# Patient Record
Sex: MALE | Race: WHITE
[De-identification: names, ages, dates, MRNs, and addresses within clinical notes are randomized per-mention and may not be internally consistent; named-entity substitution may affect disease eponyms.]

---

## 2017-10-14 ENCOUNTER — HOSPITAL ENCOUNTER (EMERGENCY)
Dept: HOSPITAL 31 - C.ER | Age: 62
Discharge: HOME | End: 2017-10-14
Payer: MEDICAID

## 2017-10-14 VITALS — HEART RATE: 57 BPM | RESPIRATION RATE: 16 BRPM | DIASTOLIC BLOOD PRESSURE: 68 MMHG | SYSTOLIC BLOOD PRESSURE: 147 MMHG

## 2017-10-14 VITALS — OXYGEN SATURATION: 100 % | TEMPERATURE: 97.7 F

## 2017-10-14 DIAGNOSIS — R10.9: Primary | ICD-10-CM

## 2017-10-14 LAB
ALBUMIN/GLOB SERPL: 1.1 {RATIO} (ref 1–2.1)
ALP SERPL-CCNC: 75 U/L (ref 38–126)
ALT SERPL-CCNC: 40 U/L (ref 21–72)
AST SERPL-CCNC: 31 U/L (ref 17–59)
BASOPHILS # BLD AUTO: 0 K/UL (ref 0–0.2)
BASOPHILS NFR BLD: 0.4 % (ref 0–2)
BILIRUB DIRECT SERPL-MCNC: 0.3 MG/DL (ref 0–0.4)
BILIRUB SERPL-MCNC: 0.8 MG/DL (ref 0.2–1.3)
BILIRUB UR-MCNC: NEGATIVE MG/DL
BUN SERPL-MCNC: 13 MG/DL (ref 9–20)
CALCIUM SERPL-MCNC: 8.9 MG/DL (ref 8.6–10.4)
CHLORIDE SERPL-SCNC: 100 MMOL/L (ref 98–107)
CO2 SERPL-SCNC: 25 MMOL/L (ref 22–30)
EOSINOPHIL # BLD AUTO: 0.1 K/UL (ref 0–0.7)
EOSINOPHIL NFR BLD: 1.5 % (ref 0–4)
ERYTHROCYTE [DISTWIDTH] IN BLOOD BY AUTOMATED COUNT: 13.3 % (ref 11.5–14.5)
GLOBULIN SER-MCNC: 3.5 GM/DL (ref 2.2–3.9)
GLUCOSE SERPL-MCNC: 276 MG/DL (ref 75–110)
GLUCOSE UR STRIP-MCNC: (no result) MG/DL
HCT VFR BLD CALC: 37.8 % (ref 35–51)
KETONES UR STRIP-MCNC: NEGATIVE MG/DL
LEUKOCYTE ESTERASE UR-ACNC: (no result) LEU/UL
LYMPHOCYTES # BLD AUTO: 1.8 K/UL (ref 1–4.3)
LYMPHOCYTES NFR BLD AUTO: 27.5 % (ref 20–40)
MCH RBC QN AUTO: 32.6 PG (ref 27–31)
MCHC RBC AUTO-ENTMCNC: 34.1 G/DL (ref 33–37)
MCV RBC AUTO: 95.6 FL (ref 80–94)
MONOCYTES # BLD: 0.5 K/UL (ref 0–0.8)
MONOCYTES NFR BLD: 7.3 % (ref 0–10)
NRBC BLD AUTO-RTO: 0 % (ref 0–2)
PH UR STRIP: 6 [PH] (ref 5–8)
PLATELET # BLD: 161 K/UL (ref 130–400)
PMV BLD AUTO: 9.1 FL (ref 7.2–11.7)
POTASSIUM SERPL-SCNC: 4.4 MMOL/L (ref 3.6–5.2)
PROT SERPL-MCNC: 7.4 G/DL (ref 6.3–8.3)
PROT UR STRIP-MCNC: NEGATIVE MG/DL
RBC # UR STRIP: NEGATIVE /UL
SODIUM SERPL-SCNC: 135 MMOL/L (ref 132–148)
SP GR UR STRIP: 1.01 (ref 1–1.03)
UROBILINOGEN UR-MCNC: NORMAL MG/DL (ref 0.2–1)
WBC # BLD AUTO: 6.5 K/UL (ref 4.8–10.8)
WBC #/AREA URNS HPF: 5 /HPF (ref 0–5)

## 2017-10-14 NOTE — CT
PROCEDURE:  CT Abdomen and Pelvis without intravenous contrast



HISTORY:

left flank pain - h/o kidney stone



COMPARISON:

None.



TECHNIQUE:

Axial and reformatted coronal and sagittal CT images of the abdomen 

and pelvis were obtained without IV or oral contrast administration.. 



Contrast Dose: 0



Radiation dose:



Total exam DLP = 347.11 mGy-cm.



This CT exam was performed using one or more of the following dose 

reduction techniques: Automated exposure control, adjustment of the 

mA and/or kV according to patient size, and/or use of iterative 

reconstruction technique.



FINDINGS:



LOWER THORAX:

Unremarkable. 



LIVER:

Unremarkable. No gross lesion or ductal dilatation.  



GALLBLADDER AND BILE DUCTS:

The gallbladder is not visualized. 



PANCREAS:

Unremarkable. No gross lesion or ductal dilatation.



SPLEEN:

Unremarkable. 



ADRENALS:

Unremarkable. No mass. 



KIDNEYS AND URETERS:

There is 4 millimeter nonobstructing calculus at the upper pole of 

the left kidney. There is 4.5 millimeter calcification at the mid to 

lower pole left kidney likely represent vascular calcification.



There is 3 millimeter calcification at the midpole right kidney 

likely represent nonobstructing calculi.  There is also 3 millimeter 

calcification at the lower pole of the right kidney represent 

nonobstructing calculi.



Mildly dilated left kidney collecting system without evidence of 

obstructing stone. Mild left perinephric stranding noted more than 

the right. 



VASCULATURE:

Unremarkable. No aortic aneurysm. 



BOWEL:

Unremarkable. No obstruction. No gross mural thickening. 



APPENDIX:

Unremarkable. Normal appendix. 



PERITONEUM:

Unremarkable. No free fluid. No free air. 



LYMPH NODES:

Unremarkable. No enlarged lymph nodes. 



BLADDER:

Mild urinary bladder wall thickening. 



REPRODUCTIVE:

Heterogeneous mildly enlarged prostate gland is noted. 



BONES:

No acute fracture. 



OTHER FINDINGS:

None.



IMPRESSION:

Bilateral nonobstructing renal calculi.



Mildly dilated left kidney collecting system without evidence of 

obstructing stone.



Otherwise no evidence of acute pathology in the abdomen and pelvis.

## 2017-10-14 NOTE — C.PDOC
History Of Present Illness


62 year old male presents to the ED with complaints of left flank pain 

intermittent for the past week. Patient states the pain started on his left 

lower back and is now located on the left flank of his abdomen. Patient has a 

prior history of kidney stones that required stem placement for removal. 

Patient denies fever, nausea, vomit, hematuria, or dysuria.   


Chief Complaint (Nursing): Male Genitourinary


History Per: Patient


History/Exam Limitations: no limitations


Onset/Duration Of Symptoms: Intermittent Episodes


Current Symptoms Are (Timing): Still Present


Quality Of Discomfort: "Pain"


Associated Symptoms: Back Pain.  denies: Fever, Nausea, Vomiting, Urinary 

Symptoms


Recent travel outside of the [place]: No


Additional History Per: Patient





Past Medical History


Reviewed: Historical Data, Nursing Documentation, Vital Signs


Vital Signs: 


 Last Vital Signs











Temp  97.7 F   10/14/17 12:36


 


Pulse  57 L  10/14/17 14:31


 


Resp  16   10/14/17 14:31


 


BP  147/68   10/14/17 14:31


 


Pulse Ox  100   10/14/17 15:22














- Medical History


PMH: Kidney Stones


Surgical History: No Surg Hx


Family History: States: Unknown Family Hx





- Social History


Hx Alcohol Use: No


Hx Substance Use: No





- Immunization History


Hx Tetanus Toxoid Vaccination: No


Hx Influenza Vaccination: No


Hx Pneumococcal Vaccination: No





Review Of Systems


Constitutional: Negative for: Fever, Chills


Gastrointestinal: Positive for: Abdominal Pain (left flank).  Negative for: 

Nausea, Vomiting


Genitourinary: Negative for: Dysuria, Hematuria, Penile Discharge


Neurological: Negative for: Weakness, Numbness





Physical Exam





- Physical Exam


Appears: Non-toxic, No Acute Distress


Skin: Normal Color, Warm, Dry


Head: Atraumatic, Normacephalic


Eye(s): bilateral: Normal Inspection


Oral Mucosa: Moist


Neck: Normal, Supple


Chest: Symmetrical


Cardiovascular: Rhythm Regular


Respiratory: Normal Breath Sounds, No Rales, No Rhonchi, No Wheezing


Gastrointestinal/Abdominal: Soft, No Tenderness, No Guarding, No Rebound


Back: CVA Tenderness (minimal left side)


Neurological/Psych: Oriented x3, Normal Speech, Normal Cognition





ED Course And Treatment





- Laboratory Results


Result Diagrams: 


 10/14/17 13:16





 10/14/17 13:16


O2 Sat by Pulse Oximetry: 100 (On RA)


Pulse Ox Interpretation: Normal





- CT Scan/US


  ** Abdomen/ Pelvis


Other Rad Studies (CT/US): Interpreted By Me, Read By Radiologist, Radiology 

Report Reviewed


CT/US Interpretation: IMPRESSION:  Bilateral nonobstructing renal calculi.  

Mildly dilated left kidney collecting system without evidence of obstructing 

stone.  Otherwise no evidence of acute pathology in the abdomen and pelvis.





Medical Decision Making


Medical Decision Making: 


Impression: 63 y/o presents with left flank pain for the past week.


Plan :


* CT abdo/pelvis ordered


* Blood work and UA ordered


* Urine culture collected





Patient is currently eating well, in no apparent distress. 





Disposition





- Disposition


Referrals: 


[facility] [Outside]


Pavel Muñoz MD [Staff Provider] - 


Disposition: HOME/ ROUTINE


Disposition Time: 15:10


Condition: GOOD


Additional Instructions: 





Thank you for letting us take care of you today. Your provider was Dr. Ho. You were treated for flank pain. The emergency medical care you 

received today was directed at your acute symptoms. If you were prescribed any 

medication, please fill it and take as directed. It may take several days for 

your symptoms to resolve. Return to the Emergency Department if your symptoms 

worsen, do not improve, or if you have any other problems.





Please contact your doctor or call one of the physicians/clinics you have been 

referred to that are listed on the Patient Visit Information form that is 

included in your discharge packet. Bring any paperwork you were given at 

discharge with you along with any medications you are taking to your follow up 

visit. Our treatment cannot replace ongoing medical care by a primary care 

provider (PCP) outside of the emergency department.





Thank you for allowing the [facility] team to be part of your care today.














Follow up with urology in 2-3 days for re-evaluation and further management.


Prescriptions: 


Ciprofloxacin [Cipro] 500 mg PO BID #14 tab


Ibuprofen [Motrin] 600 mg PO Q6 PRN #20 tab


 PRN Reason: Pain, Moderate (4-7)


Instructions:  Flank Pain (ED)


Forms:  [facility] (English)





- Clinical Impression


Clinical Impression: 


 Flank pain








- Scribe Statement


The provider has reviewed the documentation as recorded by the Scribe





Sebastián Villalba





All medical record entries made by the Scribe were at my direction and 

personally dictated by me. I have reviewed the chart and agree that the record 

accurately reflects my personal performance of the history, physical exam, 

medical decision making, and the department course for this patient. I have 

also personally directed, reviewed, and agree with the discharge instructions 

and disposition.

## 2018-07-26 ENCOUNTER — HOSPITAL ENCOUNTER (EMERGENCY)
Dept: HOSPITAL 31 - C.ER | Age: 63
Discharge: HOME | End: 2018-07-26
Payer: MEDICAID

## 2018-07-26 VITALS
HEART RATE: 86 BPM | OXYGEN SATURATION: 98 % | DIASTOLIC BLOOD PRESSURE: 73 MMHG | SYSTOLIC BLOOD PRESSURE: 165 MMHG | TEMPERATURE: 98 F

## 2018-07-26 VITALS — BODY MASS INDEX: 21.4 KG/M2

## 2018-07-26 VITALS — RESPIRATION RATE: 18 BRPM

## 2018-07-26 DIAGNOSIS — I10: Primary | ICD-10-CM

## 2018-07-26 LAB
ALBUMIN SERPL-MCNC: 4.1 G/DL (ref 3.5–5)
ALBUMIN/GLOB SERPL: 1.5 {RATIO} (ref 1–2.1)
ALT SERPL-CCNC: 37 U/L (ref 21–72)
AST SERPL-CCNC: 28 U/L (ref 17–59)
BASOPHILS # BLD AUTO: 0 K/UL (ref 0–0.2)
BASOPHILS NFR BLD: 0.4 % (ref 0–2)
BUN SERPL-MCNC: 11 MG/DL (ref 9–20)
CALCIUM SERPL-MCNC: 9.4 MG/DL (ref 8.6–10.4)
EOSINOPHIL # BLD AUTO: 0.1 K/UL (ref 0–0.7)
EOSINOPHIL NFR BLD: 1.6 % (ref 0–4)
ERYTHROCYTE [DISTWIDTH] IN BLOOD BY AUTOMATED COUNT: 13.7 % (ref 11.5–14.5)
GFR NON-AFRICAN AMERICAN: > 60
HGB BLD-MCNC: 12.6 G/DL (ref 12–18)
LIPASE: 44 U/L (ref 23–300)
LYMPHOCYTES # BLD AUTO: 2 K/UL (ref 1–4.3)
LYMPHOCYTES NFR BLD AUTO: 29.5 % (ref 20–40)
MCH RBC QN AUTO: 32.1 PG (ref 27–31)
MCHC RBC AUTO-ENTMCNC: 34 G/DL (ref 33–37)
MCV RBC AUTO: 94.4 FL (ref 80–94)
MONOCYTES # BLD: 0.6 K/UL (ref 0–0.8)
MONOCYTES NFR BLD: 9.3 % (ref 0–10)
NEUTROPHILS # BLD: 3.9 K/UL (ref 1.8–7)
NEUTROPHILS NFR BLD AUTO: 59.2 % (ref 50–75)
NRBC BLD AUTO-RTO: 0 % (ref 0–2)
PLATELET # BLD: 177 K/UL (ref 130–400)
PMV BLD AUTO: 8.5 FL (ref 7.2–11.7)
RBC # BLD AUTO: 3.91 MIL/UL (ref 4.4–5.9)
WBC # BLD AUTO: 6.7 K/UL (ref 4.8–10.8)

## 2018-07-26 NOTE — C.PDOC
History Of Present Illness


63 y/o male with history of CAD, Stent, HTN  niddm and Pancreatic cancer 

brought to ED by daughter with c/o blood pressure being high for several days . 

As per daughter,day program where patient attends notified her patient's blood 

pressure has been high. Patient was supposed to start taking Norvasc for blood 

pressure 2 weeks ago, prescrbied by Dr JOHN Laguerre, but has not been compliant with 

change. As per daughter patient, c/o epigastric abdominal pain and denies fever

, chills, nausea, vomiting, chest pain, shortness of breath or any other 

complaints at this time. 


Time Seen by Provider: 18 15:54


Chief Complaint (Nursing): High Blood Pressure


History Per: Patient, Family


History/Exam Limitations: no limitations


Onset/Duration Of Symptoms: Days


Current Symptoms Are (Timing): Still Present





Past Medical History


Reviewed: Historical Data, Nursing Documentation, Vital Signs


Vital Signs: 


 Last Vital Signs











Temp  97.8 F   18 15:28


 


Pulse  61   18 15:28


 


Resp  18   18 15:28


 


BP  171/79 H  18 15:28


 


Pulse Ox  97   18 17:55














- Medical History


PMH: Kidney Stones


Surgical History: No Surg Hx


Family History: States: No Known Family Hx





- Social History


Hx Alcohol Use: No


Hx Substance Use: No





- Immunization History


Hx Tetanus Toxoid Vaccination: No


Hx Influenza Vaccination: No


Hx Pneumococcal Vaccination: No





Review Of Systems


Constitutional: Negative for: Fever, Chills


Gastrointestinal: Positive for: Abdominal Pain.  Negative for: Nausea, Vomiting

, Diarrhea


Genitourinary: Negative for: Dysuria, Hematuria


Musculoskeletal: Negative for: Back Pain


Neurological: Negative for: Weakness, Numbness





Physical Exam





- Physical Exam


Appears: Non-toxic, No Acute Distress


Skin: Warm, Dry, No Rash


Head: Atraumatic, Normacephalic


Eye(s): bilateral: Normal Inspection


Oral Mucosa: Moist


Neck: Supple


Cardiovascular: Rhythm Regular


Respiratory: Normal Breath Sounds, No Rales, No Rhonchi, No Wheezing


Gastrointestinal/Abdominal: Soft, Tenderness (Minimal epigastric), No Guarding, 

No Rebound


Back: No CVA Tenderness


Neurological/Psych: Oriented x3, Normal Speech, Normal Cognition





ED Course And Treatment





- Laboratory Results


Result Diagrams: 


 18 16:30





 18 16:30


ECG: Interpreted By Me, Viewed By Me


ECG Rhythm: Sinus Bradycardia


Rate From EC (RA)


O2 Sat by Pulse Oximetry: 97 (BPM)





Medical Decision Making


Medical Decision Makin pt denies any any abdominal pain, sts he never had any, nor has or had any 

chest pain. daughter brought pt to ed because she was concerned about elevated 

bp, and was unaware that pt was supposed to have started on new medication (

Norvasc 2 weeks ago.  daughter sts she will see to to that he begins tonight. 

daughter was concerned pt may be having cardiac issue due to elevated bp.  pt 

has no headache, dizziness cp, sob at this time. labs normal.  ekg normal. cxr 

normal.  d/c home./ pt has appt set up with Dr Gabriel for next week.  





Disposition


Counseled Patient/Family Regarding: Studies Performed, Diagnosis, Need For 

Followup, Rx Given





- Disposition


Referrals: 


Razia Gabriel MD [Staff Provider] - 


Disposition: HOME/ ROUTINE


Disposition Time: 17:57


Condition: GOOD


Additional Instructions: 


Please add prescribed dose of Novasc to your daily medications.  Follow up with 

Dr Gabriel as prescrbied. Recommend establishing a relationship with a 

cardiiologist and a gastroenterologist.  Return to ER for any worse symptoms.  


Instructions:  High Blood Pressure (DC)


Forms:  [facility] (English), General Discharge Instructions





- Clinical Impression


Clinical Impression: 


 Hypertension








- PA / NP / Resident Statement


MD/DO has reviewed & agrees with the documentation as recorded.





- Scribe Statement


The provider has reviewed the documentation as recorded by the Ovidio Pandey





All medical record entries made by the Ovidio were at my direction and 

personally dictated by me. I have reviewed the chart and agree that the record 

accurately reflects my personal performance of the history, physical exam, 

medical decision making, and the department course for this patient. I have 

also personally directed, reviewed, and agree with the discharge instructions 

and disposition.

## 2018-07-26 NOTE — RAD
Date of service: 



07/26/2018



HISTORY:

chest pain  



COMPARISON:

No prior.



TECHNIQUE:

Chest PA and lateral



FINDINGS:



LUNGS:

No active pulmonary disease.



PLEURA:

No significant pleural effusion identified. No pneumothorax apparent.



CARDIOVASCULAR:

Normal.



OSSEOUS STRUCTURES:

Mild degenerative changes.



VISUALIZED UPPER ABDOMEN:

Normal.



OTHER FINDINGS:

None.



IMPRESSION:

No active disease.

## 2018-07-27 NOTE — CARD
--------------- APPROVED REPORT --------------





Date of service: 07/26/2018



EKG Measurement

Heart [id number]

[id number]

[id number]

[id number]

[id number]



<Conclusion>

Sinus bradycardia

Right bundle branch block

Abnormal ECG